# Patient Record
(demographics unavailable — no encounter records)

---

## 2024-10-07 NOTE — PLAN
[FreeTextEntry1] : Vit B12 advised due to use of Pantoprazole  Lifestyle Medicine handouts regarding whole food plant-based diet provided Swimming encouraged Resource info on vaginal moisturizers in hand from [employer] Will f/u with PCP for elevated BP RTO prn or for annual

## 2024-10-07 NOTE — HISTORY OF PRESENT ILLNESS
[FreeTextEntry1] : This 65 year old P1  LMP 2010 presents for annual. Denies PMB, some vaginal dryness persists, otherwise denies irritation; voiding and stooling without change in pattern, medical hx unchanged- bursitis of hips limiting exercise, BP readings reported to be lower outside this office, but she has not been to PCP in almost one year; no change to surgical or family hx. [Mammogramdate] : July 2024 [TextBox_19] : BIRAD 2 [BreastSonogramDate] : July 2024 [PapSmeardate] : Oct 2023 [TextBox_31] : neg pap and HPV [BoneDensityDate] : Oct 2023 [TextBox_37] : osteopenia [ColonoscopyDate] : Jan 2022 [TextBox_43] : see chart

## 2024-12-09 NOTE — PHYSICAL EXAM
[No Acute Distress] : no acute distress [Well Nourished] : well nourished [Well Developed] : well developed [Well-Appearing] : well-appearing [Normal Sclera/Conjunctiva] : normal sclera/conjunctiva [PERRL] : pupils equal round and reactive to light [EOMI] : extraocular movements intact [Normal Outer Ear/Nose] : the outer ears and nose were normal in appearance [Normal Oropharynx] : the oropharynx was normal [Normal TMs] : both tympanic membranes were normal [No JVD] : no jugular venous distention [No Lymphadenopathy] : no lymphadenopathy [Supple] : supple [No Respiratory Distress] : no respiratory distress  [No Accessory Muscle Use] : no accessory muscle use [Clear to Auscultation] : lungs were clear to auscultation bilaterally [Normal Rate] : normal rate  [Regular Rhythm] : with a regular rhythm [Normal S1, S2] : normal S1 and S2 [No Carotid Bruits] : no carotid bruits [No Edema] : there was no peripheral edema [Normal Appearance] : normal in appearance [No Nipple Discharge] : no nipple discharge [No Axillary Lymphadenopathy] : no axillary lymphadenopathy [Soft] : abdomen soft [Non Tender] : non-tender [Non-distended] : non-distended [No Masses] : no abdominal mass palpated [No HSM] : no HSM [Normal Bowel Sounds] : normal bowel sounds [No CVA Tenderness] : no CVA  tenderness [No Joint Swelling] : no joint swelling [No Rash] : no rash [Coordination Grossly Intact] : coordination grossly intact [No Focal Deficits] : no focal deficits [Normal Gait] : normal gait [Deep Tendon Reflexes (DTR)] : deep tendon reflexes were 2+ and symmetric [Speech Grossly Normal] : speech grossly normal [Memory Grossly Normal] : memory grossly normal [Normal Affect] : the affect was normal [Alert and Oriented x3] : oriented to person, place, and time [Normal Mood] : the mood was normal [Normal Insight/Judgement] : insight and judgment were intact [de-identified] : soft systolic murmur [de-identified] : skin tag on right nipple, stable for years according to pt

## 2024-12-09 NOTE — HEALTH RISK ASSESSMENT
[Good] : ~his/her~  mood as  good [No] : In the past 12 months have you used drugs other than those required for medical reasons? No [No falls in past year] : Patient reported no falls in the past year [Former] : Former [> 15 Years] : > 15 Years [Patient reported mammogram was normal] : Patient reported mammogram was normal [Patient reported colonoscopy was normal] : Patient reported colonoscopy was normal [None] : None [With Family] : lives with family [Employed] : employed [Feels Safe at Home] : Feels safe at home [Fully functional (bathing, dressing, toileting, transferring, walking, feeding)] : Fully functional (bathing, dressing, toileting, transferring, walking, feeding) [Fully functional (using the telephone, shopping, preparing meals, housekeeping, doing laundry, using] : Fully functional and needs no help or supervision to perform IADLs (using the telephone, shopping, preparing meals, housekeeping, doing laundry, using transportation, managing medications and managing finances) [Smoke Detector] : smoke detector [Carbon Monoxide Detector] : carbon monoxide detector [Seat Belt] :  uses seat belt [de-identified] : active [de-identified] : regular [Reports changes in hearing] : Reports no changes in hearing [Reports changes in vision] : Reports no changes in vision [Reports changes in dental health] : Reports no changes in dental health [MammogramDate] : 6/22 [ColonoscopyDate] : 1/22

## 2024-12-09 NOTE — ASSESSMENT
[FreeTextEntry1] : HCM  Labs discussed with pt today  repeat lfts in 2 weeks off of diclofenac  Gyn, mammogram utd  Colonoscopy utd 2022  Advised derm  Tdap 2023 utd  continue current meds  cardio eval and echocardiogram recommended for soft systolic heart murmur  f/u prn or 1 year.

## 2024-12-09 NOTE — HISTORY OF PRESENT ILLNESS
[FreeTextEntry1] : Annual Physical [de-identified] : OSITO MURPHY is a 66 yo woman here for a physical.  She has been well overall. Doing well overall.  Abnormal lfts discussed with pt today, will repeat off of diclfenac.  Plantar fascitis has been acting up  Gyn, mammogram utd. Colonoscopy utd 2022 dr kaplan.  Advised derm.  Had flu shot.   The patient is  with 1 son who is at [facility]..  She retired from her job as a teacher's aid.  She has no difficulty walking 4 to 6 blocks or 2 flights of stairs.  Staying active but hip and feet are acting up

## 2025-01-16 NOTE — REASON FOR VISIT
[Follow-Up] : a follow-up visit [TextBox_44] : via video call- mild intermittent / cough-variant asthma, allergies, ?JARON

## 2025-01-16 NOTE — ASSESSMENT
[FreeTextEntry1] : Ms. MURPHY is a 65-year-old female with a history of childhood diseases, cough variant asthma, cervical radiculopathy, hip bursitis, osteoarthritis of the shoulder, post-nasal drip/ allergies; s/p COVID 19 1/2022 (triple vax)- s/p influenza A - PND/bronchospasm, s/p mild allergy Sx (Nasal congestion / dryness) - #1 issue is URI-induced asthma 1/2025  The patient's shortness of breath is multifactorial due to: -pulmonary disease  -mild intermittent / cough variant asthma -poor breathing mechanics -overweight/out of shape (related to hip issues) -?cardiac disease (not likely)  Problem 1: mild intermittent / cough variant asthma (active with URI 1/2025) -s/p Prednisone 20mg for 7 days then 10mg for 7 days (12/2022, 1/2025) -continue Symbicort (160) 2 inhalations BID (to take immediately when she gets sick) or Breo Ellipta 200 at 1 inhalation QD -continue Singulair 10 mg QHS -continue Ventolin rescue inhaler 2 inhalations before exercise, Q6H  -Asthma is believed to be caused by inherited (genetic) and environmental factor, but its exact cause is unknown. Asthma may be triggered by allergens, lung infections, or irritants in the air. Asthma triggers are different for each person -Inhaler technique reviewed as well as oral hygiene techniques reviewed with patient. Avoidance of cold air, extremes of temperature, rescue inhaler should be used before exercise. Order of medication reviewed with patient. Recommended use of a cool mist humidifier in the bedroom. -Information sheet given to the patient to be reviewed, this medication is never to be used without consulting the prescribing physician. Proper dietary restraint is necessary specifically salt containing foods, if any reaction may occur should be reported.  Problem 2: Allergy (active) -Continue to use Allegra 180 QAM or Claritin 10 mg QAM -add Vicks Sinex saline nasal spray  -continue Flonase 1 sniff each nostril BID (PRN) -continue Astelin (.10) 1 sniff each nostril BID -recommended Nasagel or Boroleum for nasal dryness -s/p blood work to include: IgE level(-), eosinophil level(-), vitamin D level(-), food IgE level(-), and asthma profile (+) Environmental measures for allergies were encouraged including mattress and pillow cover, air purifier, and environmental controls.  Problem 2A: URI 1/2025 -add Marisa [place] Cold & Flu  -steam the sinuses -add Vicks Sinex saline nasal spray  -no role for Abx  Problem 2B: Non-allergic rhinitis -continue Atrovent 0.06% at 1-2 sniffs/nostril, up to TID  Problem 2C: GERD (controlled) -continue Pepcid 40 mg QHS -continue Protonix 40 mg QAM, pre-breakfast -Rule of 2s: avoid eating too much, eating too late, eating too spicy, eating two hours before bed. - Things to avoid including overeating, spicy foods, tight clothing, eating within two hours of bed, this list is not all inclusive. - For treatments of reflux, possible options discussed including diet control, H2 blockers, PPIs, as well as coating motility agents discussed as treatment options. Timing of meals and proximity of last meal to sleep were discussed. If symptoms persist, a formal gastrointestinal evaluation is needed.  Problem 3: No immune deficiency -recommended Sanotize anti viral nasal spray in case of viral infection -s/p blood work: strep pneumonia titers, IgG levels, quantitative immunoglobulins WNL 1/2021 -Due to the fact that this pt has had more infections than would be expected and immunological blood work is indicated this would include: IgG subclasses, quantitative immunoglobulins, Strep pneumoniae titers as well as Vitamin D levels. Based on this blood work we will be able to decide where the pt needs additional pneumococcal vaccine either Prevnar 13 or pneumovax. Immunology evaluation will also be potentially indicated  Problem 4: R/o JARON (?) -Recommended to complete a home sleep study due to her snoring, elevated MP class, and EDS (if indicated) -Sleep apnea is associated with adverse clinical consequences which can affect most organ systems. Cardiovascular disease risk includes arrhythmias, atrial fibrillation, hypertension, coronary artery disease, and stroke. Metabolic disorders include diabetes type 2, non-alcoholic fatty liver disease. Mood disorder especially depression; and cognitive decline especially in the elderly. Associations with chronic reflux/Galvan's esophagus some but not all inclusive. -Reasons include arousal consistent with hypopnea; respiratory events most prominent in REM sleep or supine position; therefore sleep staging and body position are important for accurate diagnosis and estimation of AHI.  Problem 5: Poor Mechanics of Breathing (Wimhof / Buteyko) -Recommended to check her leg lengths at a podiatrist or running store ([employer] in [place]) - Proper breathing techniques were reviewed with an emphasis of exhalation. Patient instructed to breath in for 1 second and out for four seconds. Patient was encouraged to not talk while walking.  Problem 6: Overweight - improved -Weight loss, exercise, and diet control were discussed and are highly encouraged. Treatment options were given such as, aqua therapy, and contacting a nutritionist. Recommended to use the elliptical, stationary bike, less use of treadmill. Mindful eating was explained to the patient Obesity is associated with worsening asthma, shortness of breath, and potential for cardiac disease, diabetes, and other underlying medical conditions.  Problem 7: Cardiac -recommended to follow up with a cardiologist if needed  Problem 8: health maintenance -s/p RSV vaccine (2023) -s/p COVID 19 1/2022 - S/p Covid 19 vaccine (Pfizer) x3 -s/p influenza vaccine - 2024 -recommended strep pneumonia vaccines after age 65: Prevnar-13 vaccine, followed by Pneumo vaccine 23 one year following -s/p DPT vaccine 12/2023  -recommended early intervention for URIs -recommended regular osteoporosis evaluations -recommended early dermatological evaluations -recommended after the age of 50 to the age of 70, colonoscopy every 5 years  Follow up in 6 months  -She is recommended to call with any changes, questions, or concerns.

## 2025-01-16 NOTE — HISTORY OF PRESENT ILLNESS
[Home] : at home, [unfilled] , at the time of the visit. [Medical Office: ([facility] [place])___] : at the medical office located in  [Verbal consent obtained from patient] : the patient, [unfilled] [TextBox_4] : Ms. MURPHY is a 65-year-old female presenting to the office today via video call for a follow-up pulmonary evaluation. Her chief complaint is  -she notes she was feeling fine before her most recent URI -she notes exercising (pickleball) now that her plantar fasciitis has resolved -she notes her URI started with sinus congestion 3 days ago. She now has a fever, and her Sx are progressing -she notes chest congestion -she notes she occasionally brings up sputum -she notes bowels are regular  -she notes a mild sour taste in the mouth -she notes gaining weight due to lack of exercise prior with the plantar fasciitis -she notes she's using Flonase and Astelin -she denies using her inhalers -she notes she loses her voice  -she denies any headaches, nausea, emesis, fever, chills, sweats, chest pain, chest pressure, wheezing, palpitations, diarrhea, constipation, dysphagia, vertigo, arthralgias, myalgias, leg swelling, itchy eyes, itchy ears, heartburn, reflux

## 2025-01-16 NOTE — ADDENDUM
[FreeTextEntry1] : Documented by Melissa Rivero acting as a scribe for Dr. Tariq Sy on 01/16/2025. All medical record entries made by the Scribe were at my, Dr. Tariq Sy's, direction and personally dictated by me on 01/16/2025. I have reviewed the chart and agree that the record accurately reflects my personal performance of the history, physical exam, assessment and plan. I have also personally directed, reviewed, and agree with the discharge instructions.

## 2025-07-22 NOTE — PROCEDURE
[FreeTextEntry1] : Full PFT reveals normal flows; FEV1 was 1.88 L which is 81% of predicted; normal lung volumes; normal diffusion at 14.84, which is 75% of predicted; normal flow volume loop.   PFTs were performed to evaluate for SOB, asthma  FENO was insurance does not cover; a normal value being less than 25  Fractional exhaled nitric oxide (FENO) is regarded as a simple, noninvasive method for assessing eosinophilic airway inflammation. Produced by a variety of cells within the lung, nitric oxide (NO) concentrations are generally low in healthy individuals. However, high concentrations of NO appear to be involved in nonspecific host defense mechanisms and chronic inflammatory diseases such as asthma. The American Thoracic Society (ATS) therefore has recommended using FENO to aid in the diagnosis and monitoring of eosinophilic airway inflammation and asthma, and for identifying steroid responsive individuals whose chronic respiratory symptoms may be caused by airway inflammation.

## 2025-07-22 NOTE — ADDENDUM
[FreeTextEntry1] : Documented by Shannon Hugo acting as a scribe for Dr. Tariq Sy on 07/22/2025. All medical record entries made by the Scribe were at my, Dr. Tariq Sy's, direction and personally dictated by me on 07/22/2025.  I have reviewed the chart and agree that the record accurately reflects my personal performance of the history, physical exam, assessment and plan. I have also personally directed, reviewed, and agree with the discharge instructions.

## 2025-07-22 NOTE — END OF VISIT
Hypertension is borderline  Medication changes per orders.  Ambulatory blood pressure monitoring.  I will increase losartan 50 mg dose to twice daily.  Blood pressure will be reassessedin 3 months.    Orders:    losartan (Cozaar) 50 MG tablet; Take 1 tablet by mouth 2 (Two) Times a Day. Indications: High Blood Pressure    
[Time Spent: ___ minutes] : I have spent [unfilled] minutes of time on the encounter which excludes teaching and separately reported services.

## 2025-07-22 NOTE — ASSESSMENT
[FreeTextEntry1] : Ms. MURPHY is a 66-year-old female with a history of childhood diseases, cough variant asthma, cervical radiculopathy, hip bursitis, osteoarthritis of the shoulder, post-nasal drip/ allergies; s/p COVID 19 1/2022 (triple vax)- s/p influenza A - PND/bronchospasm, s/p mild allergy Sx (Nasal congestion / dryness) - s/p  is URI-induced asthma 1/2025, covid 19 6/2025-mild allergy Sx   The patient's shortness of breath is multifactorial due to: -pulmonary disease  -mild intermittent / cough variant asthma -poor breathing mechanics -overweight/out of shape (related to hip issues) -?cardiac disease (not likely)  Problem 1: mild intermittent / cough variant asthma (active with URI 1/2025) -s/p Prednisone 20mg for 7 days then 10mg for 7 days (12/2022, 1/2025) -continue Symbicort (160) 2 inhalations BID (to take immediately when she gets sick) or Breo Ellipta 200 at 1 inhalation QD -continue Singulair 10 mg QHS -continue Ventolin rescue inhaler 2 inhalations before exercise, Q6H  -Asthma is believed to be caused by inherited (genetic) and environmental factor, but its exact cause is unknown. Asthma may be triggered by allergens, lung infections, or irritants in the air. Asthma triggers are different for each person -Inhaler technique reviewed as well as oral hygiene techniques reviewed with patient. Avoidance of cold air, extremes of temperature, rescue inhaler should be used before exercise. Order of medication reviewed with patient. Recommended use of a cool mist humidifier in the bedroom. -Information sheet given to the patient to be reviewed, this medication is never to be used without consulting the prescribing physician. Proper dietary restraint is necessary specifically salt containing foods, if any reaction may occur should be reported.  Problem 2: Allergy (semi active) -add Xyzal 5 mg QHS -Continue to use Allegra 180 QAM or Claritin 10 mg QAM -add Vicks Sinex saline nasal spray  -continue Flonase 1 sniff each nostril BID (PRN) -continue Astelin (.10) 1 sniff each nostril BID -recommended Nasagel or Boroleum for nasal dryness -s/p blood work to include: IgE level(-), eosinophil level(-), vitamin D level(-), food IgE level(-), and asthma profile (+) Environmental measures for allergies were encouraged including mattress and pillow cover, air purifier, and environmental controls.  Problem 2A: URI 1/2025 (resolved) -s/p Marisa [place] Cold & Flu  -steam the sinuses -s/p Vicks Sinex saline nasal spray  -no role for Abx  Problem 2B: Non-allergic rhinitis -continue Atrovent 0.06% at 1-2 sniffs/nostril, up to TID  Problem 2C: GERD (controlled) -continue Pepcid 40 mg QHS -continue Protonix 40 mg QAM, pre-breakfast -Rule of 2s: avoid eating too much, eating too late, eating too spicy, eating two hours before bed. - Things to avoid including overeating, spicy foods, tight clothing, eating within two hours of bed, this list is not all inclusive. - For treatments of reflux, possible options discussed including diet control, H2 blockers, PPIs, as well as coating motility agents discussed as treatment options. Timing of meals and proximity of last meal to sleep were discussed. If symptoms persist, a formal gastrointestinal evaluation is needed.  Problem 3: No immune deficiency -recommended Sanotize anti viral nasal spray in case of viral infection -s/p blood work: strep pneumonia titers, IgG levels, quantitative immunoglobulins WNL 1/2021 -Due to the fact that this pt has had more infections than would be expected and immunological blood work is indicated this would include: IgG subclasses, quantitative immunoglobulins, Strep pneumoniae titers as well as Vitamin D levels. Based on this blood work we will be able to decide where the pt needs additional pneumococcal vaccine either Prevnar 13 or pneumovax. Immunology evaluation will also be potentially indicated  Problem 4: R/o JARON (?) -Recommended to complete a home sleep study due to her snoring, elevated MP class, and EDS (if indicated) -Sleep apnea is associated with adverse clinical consequences which can affect most organ systems. Cardiovascular disease risk includes arrhythmias, atrial fibrillation, hypertension, coronary artery disease, and stroke. Metabolic disorders include diabetes type 2, non-alcoholic fatty liver disease. Mood disorder especially depression; and cognitive decline especially in the elderly. Associations with chronic reflux/Galvna's esophagus some but not all inclusive. -Reasons include arousal consistent with hypopnea; respiratory events most prominent in REM sleep or supine position; therefore sleep staging and body position are important for accurate diagnosis and estimation of AHI.  Problem 5: Poor Mechanics of Breathing (Wimhof / Buteyko) -Recommended to check her leg lengths at a podiatrist or running store ([employer] in [place]) - Proper breathing techniques were reviewed with an emphasis of exhalation. Patient instructed to breath in for 1 second and out for four seconds. Patient was encouraged to not talk while walking.  Problem 6: Overweight - improved -Weight loss, exercise, and diet control were discussed and are highly encouraged. Treatment options were given such as, aqua therapy, and contacting a nutritionist. Recommended to use the elliptical, stationary bike, less use of treadmill. Mindful eating was explained to the patient Obesity is associated with worsening asthma, shortness of breath, and potential for cardiac disease, diabetes, and other underlying medical conditions.  Problem 7: Cardiac -recommended to follow up with a cardiologist if needed  Problem 8: health maintenance -s/p RSV vaccine (2023) -s/p COVID 19 1/2022 - S/p Covid 19 vaccine (Pfizer) x3 -s/p influenza vaccine - 2024 -recommended strep pneumonia vaccines after age 65: Prevnar-13 vaccine, followed by Pneumo vaccine 23 one year following -s/p DPT vaccine 12/2023  -recommended early intervention for URIs -recommended regular osteoporosis evaluations -recommended early dermatological evaluations -recommended after the age of 50 to the age of 70, colonoscopy every 5 years  Follow up in 6 months  -She is recommended to call with any changes, questions, or concerns.

## 2025-07-22 NOTE — REASON FOR VISIT
[Follow-Up] : a follow-up visit [TextBox_44] : mild intermittent / cough-variant asthma, allergies, ?JARON